# Patient Record
(demographics unavailable — no encounter records)

---

## 2024-11-19 NOTE — DISCUSSION/SUMMARY
[de-identified] : After discussing various treatment options with the patient including but not limited to oral medications, physical therapy, exercise modalities as well as interventional spinal injections, we have decided with the following plan:  - Continue Home exercises, stretching, activity modification, physical therapy, and conservative care. - MRI report and/or images was reviewed and discussed with the patient. - Recommend Right L3-4, L4-5 Transforaminal Epidural Steroid Injection under fluoroscopic guidance with image. - The risks, benefits and alternatives of the proposed procedure were explained in detail with the patient. The risks outlined include but are not limited to infection, bleeding, post-dural puncture headache, nerve injury, a temporary increase in pain, failure to resolve symptoms, allergic reaction, symptom recurrence, and possible elevation of blood sugar in diabetics. All questions were answered to patient's apparent satisfaction and he/she verbalized an understanding. - Patient is presenting with acute/sub-acute radicular pain with impairment in ADLs and functionality.  The pain has not responded to conservative care including NSAID therapy and/or physical therapy.  There is no bleeding tendency, unstable medical condition, or systemic infection. - Follow up in 1-2 weeks post injection for re-evaluation. - Will call to schedule.

## 2024-11-19 NOTE — PHYSICAL EXAM
[de-identified] : Constitutional; Appears well, no apparent distress Ability to communicate: Normal  Respiratory: non-labored breathing Skin: No rash noted Head: Normocephalic, atraumatic Neck: no visible thyroid enlargement Eyes: Extraocular movements intact Neurologic: Alert and oriented x3 Psychiatric: normal mood, affect and behavior [] : non-antalgic

## 2024-11-19 NOTE — HISTORY OF PRESENT ILLNESS
[Lower back] : lower back [8] : 8 [6] : 6 [Burning] : burning [Dull/Aching] : dull/aching [Radiating] : radiating [Sharp] : sharp [Shooting] : shooting [Stabbing] : stabbing [Throbbing] : throbbing [Tightness] : tightness [Constant] : constant [Household chores] : household chores [Meds] : meds [Walking] : walking [FreeTextEntry1] : Initial HPI 11/19/2024: Pain started over a month ago and is on the RIGHT side of the lower back and radiates into the right groin and anterior thigh the knee described as a sharp shooting burning pain with associated tingling.   MRI Lumbar Spine 8/27/24 independently reviewed: multilevel DDD L3-S1; L3-4 severe right and moderate left NF stenosis with R>L L3 impingement; L4-5 severe right and moderate left NF stenosis and b/l L4 and right L5 impingement.  Conservative Care: has been doing PT  Pain Medications: Baclofen/Flexeril PRN, Tramadol PRN, Gabapentin 100mg QHS  Past Injections: none Spine surgery: none  Blood thinners: none [] : no [FreeTextEntry7] : right groin into the leg  [FreeTextEntry9] : pain killers